# Patient Record
Sex: FEMALE | Race: WHITE | NOT HISPANIC OR LATINO | Employment: FULL TIME | URBAN - METROPOLITAN AREA
[De-identification: names, ages, dates, MRNs, and addresses within clinical notes are randomized per-mention and may not be internally consistent; named-entity substitution may affect disease eponyms.]

---

## 2021-04-28 ENCOUNTER — IMMUNIZATION (OUTPATIENT)
Dept: PRIMARY CARE CLINIC | Facility: CLINIC | Age: 46
End: 2021-04-28
Payer: COMMERCIAL

## 2021-04-28 DIAGNOSIS — Z23 NEED FOR VACCINATION: Primary | ICD-10-CM

## 2021-04-28 PROCEDURE — 0011A PR IMMUNIZ ADMIN, SARS-COV-2 COVID-19 VACC, 100MCG/0.5ML, 1ST DOSE: ICD-10-PCS | Mod: CV19,S$GLB,, | Performed by: INTERNAL MEDICINE

## 2021-04-28 PROCEDURE — 0011A PR IMMUNIZ ADMIN, SARS-COV-2 COVID-19 VACC, 100MCG/0.5ML, 1ST DOSE: CPT | Mod: CV19,S$GLB,, | Performed by: INTERNAL MEDICINE

## 2021-04-28 PROCEDURE — 91301 PR SARS-COV-2 COVID-19 VACCINE, NO PRSV, 100MCG/0.5ML, IM: ICD-10-PCS | Mod: S$GLB,,, | Performed by: INTERNAL MEDICINE

## 2021-04-28 PROCEDURE — 91301 PR SARS-COV-2 COVID-19 VACCINE, NO PRSV, 100MCG/0.5ML, IM: CPT | Mod: S$GLB,,, | Performed by: INTERNAL MEDICINE

## 2021-04-28 RX ADMIN — Medication 0.5 ML: at 10:04

## 2021-09-11 ENCOUNTER — LAB VISIT (OUTPATIENT)
Dept: FAMILY MEDICINE | Facility: CLINIC | Age: 46
End: 2021-09-11
Payer: COMMERCIAL

## 2021-09-11 DIAGNOSIS — Z20.822 ENCOUNTER FOR LABORATORY TESTING FOR COVID-19 VIRUS: ICD-10-CM

## 2021-09-11 PROCEDURE — U0003 INFECTIOUS AGENT DETECTION BY NUCLEIC ACID (DNA OR RNA); SEVERE ACUTE RESPIRATORY SYNDROME CORONAVIRUS 2 (SARS-COV-2) (CORONAVIRUS DISEASE [COVID-19]), AMPLIFIED PROBE TECHNIQUE, MAKING USE OF HIGH THROUGHPUT TECHNOLOGIES AS DESCRIBED BY CMS-2020-01-R: HCPCS | Performed by: EMERGENCY MEDICINE

## 2021-09-11 PROCEDURE — U0005 INFEC AGEN DETEC AMPLI PROBE: HCPCS | Performed by: EMERGENCY MEDICINE

## 2021-09-13 LAB
SARS-COV-2 RNA RESP QL NAA+PROBE: NOT DETECTED
SARS-COV-2- CYCLE NUMBER: NORMAL

## 2022-01-18 ENCOUNTER — LAB VISIT (OUTPATIENT)
Dept: PRIMARY CARE CLINIC | Facility: CLINIC | Age: 47
End: 2022-01-18
Payer: COMMERCIAL

## 2022-01-18 DIAGNOSIS — Z20.822 CONTACT WITH AND (SUSPECTED) EXPOSURE TO COVID-19: ICD-10-CM

## 2022-01-18 LAB
CTP QC/QA: YES
SARS-COV-2 AG RESP QL IA.RAPID: NEGATIVE

## 2022-01-18 PROCEDURE — 87811 SARS-COV-2 COVID19 W/OPTIC: CPT

## 2023-01-09 ENCOUNTER — OFFICE VISIT (OUTPATIENT)
Dept: URGENT CARE | Facility: CLINIC | Age: 48
End: 2023-01-09
Payer: COMMERCIAL

## 2023-01-09 VITALS
WEIGHT: 171 LBS | RESPIRATION RATE: 19 BRPM | DIASTOLIC BLOOD PRESSURE: 82 MMHG | OXYGEN SATURATION: 96 % | SYSTOLIC BLOOD PRESSURE: 125 MMHG | TEMPERATURE: 98 F | BODY MASS INDEX: 28.49 KG/M2 | HEART RATE: 94 BPM | HEIGHT: 65 IN

## 2023-01-09 DIAGNOSIS — R05.9 COUGH, UNSPECIFIED TYPE: Primary | ICD-10-CM

## 2023-01-09 DIAGNOSIS — J18.9 PNEUMONIA DUE TO INFECTIOUS ORGANISM, UNSPECIFIED LATERALITY, UNSPECIFIED PART OF LUNG: ICD-10-CM

## 2023-01-09 LAB
CTP QC/QA: YES
CTP QC/QA: YES
POC MOLECULAR INFLUENZA A AGN: NEGATIVE
POC MOLECULAR INFLUENZA B AGN: NEGATIVE
SARS-COV-2 AG RESP QL IA.RAPID: NEGATIVE

## 2023-01-09 PROCEDURE — 1159F PR MEDICATION LIST DOCUMENTED IN MEDICAL RECORD: ICD-10-PCS | Mod: CPTII,S$GLB,, | Performed by: FAMILY MEDICINE

## 2023-01-09 PROCEDURE — 3008F BODY MASS INDEX DOCD: CPT | Mod: CPTII,S$GLB,, | Performed by: FAMILY MEDICINE

## 2023-01-09 PROCEDURE — 87502 POCT INFLUENZA A/B MOLECULAR: ICD-10-PCS | Mod: QW,S$GLB,, | Performed by: FAMILY MEDICINE

## 2023-01-09 PROCEDURE — 3079F PR MOST RECENT DIASTOLIC BLOOD PRESSURE 80-89 MM HG: ICD-10-PCS | Mod: CPTII,S$GLB,, | Performed by: FAMILY MEDICINE

## 2023-01-09 PROCEDURE — 1160F PR REVIEW ALL MEDS BY PRESCRIBER/CLIN PHARMACIST DOCUMENTED: ICD-10-PCS | Mod: CPTII,S$GLB,, | Performed by: FAMILY MEDICINE

## 2023-01-09 PROCEDURE — 87811 SARS CORONAVIRUS 2 ANTIGEN POCT, MANUAL READ: ICD-10-PCS | Mod: QW,S$GLB,, | Performed by: FAMILY MEDICINE

## 2023-01-09 PROCEDURE — 3008F PR BODY MASS INDEX (BMI) DOCUMENTED: ICD-10-PCS | Mod: CPTII,S$GLB,, | Performed by: FAMILY MEDICINE

## 2023-01-09 PROCEDURE — 3074F PR MOST RECENT SYSTOLIC BLOOD PRESSURE < 130 MM HG: ICD-10-PCS | Mod: CPTII,S$GLB,, | Performed by: FAMILY MEDICINE

## 2023-01-09 PROCEDURE — 87502 INFLUENZA DNA AMP PROBE: CPT | Mod: QW,S$GLB,, | Performed by: FAMILY MEDICINE

## 2023-01-09 PROCEDURE — 87811 SARS-COV-2 COVID19 W/OPTIC: CPT | Mod: QW,S$GLB,, | Performed by: FAMILY MEDICINE

## 2023-01-09 PROCEDURE — 3074F SYST BP LT 130 MM HG: CPT | Mod: CPTII,S$GLB,, | Performed by: FAMILY MEDICINE

## 2023-01-09 PROCEDURE — 1159F MED LIST DOCD IN RCRD: CPT | Mod: CPTII,S$GLB,, | Performed by: FAMILY MEDICINE

## 2023-01-09 PROCEDURE — 99214 PR OFFICE/OUTPT VISIT, EST, LEVL IV, 30-39 MIN: ICD-10-PCS | Mod: S$GLB,,, | Performed by: FAMILY MEDICINE

## 2023-01-09 PROCEDURE — 99214 OFFICE O/P EST MOD 30 MIN: CPT | Mod: S$GLB,,, | Performed by: FAMILY MEDICINE

## 2023-01-09 PROCEDURE — 1160F RVW MEDS BY RX/DR IN RCRD: CPT | Mod: CPTII,S$GLB,, | Performed by: FAMILY MEDICINE

## 2023-01-09 PROCEDURE — 3079F DIAST BP 80-89 MM HG: CPT | Mod: CPTII,S$GLB,, | Performed by: FAMILY MEDICINE

## 2023-01-09 RX ORDER — BENZONATATE 200 MG/1
200 CAPSULE ORAL 3 TIMES DAILY PRN
Qty: 30 CAPSULE | Refills: 0 | Status: SHIPPED | OUTPATIENT
Start: 2023-01-09 | End: 2023-01-10 | Stop reason: SDUPTHER

## 2023-01-09 RX ORDER — DOXYCYCLINE HYCLATE 100 MG
100 TABLET ORAL 2 TIMES DAILY
Qty: 20 TABLET | Refills: 0 | Status: SHIPPED | OUTPATIENT
Start: 2023-01-09 | End: 2023-01-10 | Stop reason: SDUPTHER

## 2023-01-09 NOTE — PROGRESS NOTES
Subjective:       Patient ID: Monica Staton is a 47 y.o. female.    Vitals:  vitals were not taken for this visit.     Chief Complaint: Cough    47 year old patient presents cough. Patient stated symptoms started Thursday    Cough  This is a new problem. The current episode started in the past 7 days (thursday). The problem has been gradually worsening. The problem occurs hourly. Associated symptoms include headaches, nasal congestion and sweats. She has tried OTC cough suppressant for the symptoms. The treatment provided no relief.     Respiratory:  Positive for cough.    Neurological:  Positive for headaches.     Objective:      Physical Exam   Constitutional: She is oriented to person, place, and time. She appears ill. No distress. normal  HENT:   Head: Normocephalic and atraumatic.   Ears:   Right Ear: Tympanic membrane, external ear and ear canal normal.   Left Ear: Tympanic membrane, external ear and ear canal normal.   Nose: Rhinorrhea and congestion present.   Mouth/Throat: Mucous membranes are moist. Oropharynx is clear.   Eyes: Conjunctivae are normal. Pupils are equal, round, and reactive to light. Extraocular movement intact   Neck: Neck supple. No neck rigidity present.   Cardiovascular: Normal rate, regular rhythm, normal heart sounds and normal pulses.   Pulmonary/Chest: Effort normal. No respiratory distress. She has wheezes. She has rhonchi.   Abdominal: Normal appearance and bowel sounds are normal. Soft. flat abdomen   Musculoskeletal: Normal range of motion.         General: No swelling or tenderness. Normal range of motion.   Neurological: no focal deficit. She is alert and oriented to person, place, and time. She displays no weakness. No cranial nerve deficit or sensory deficit.   Skin: Skin is warm and dry. Capillary refill takes less than 2 seconds. jaundice  Psychiatric: Her behavior is normal. Mood, judgment and thought content normal.   Nursing note and vitals reviewed.       Assessment:Plan:     1. Cough, unspecified type  - SARS Coronavirus 2 Antigen, POCT Manual Read  - POCT Influenza A/B MOLECULAR  - XR CHEST PA AND LATERAL; Future  - benzonatate (TESSALON) 200 MG capsule; Take 1 capsule (200 mg total) by mouth 3 (three) times daily as needed.  Dispense: 30 capsule; Refill: 0    2. Pneumonia due to infectious organism, unspecified laterality, unspecified part of lung  - doxycycline (VIBRA-TABS) 100 MG tablet; Take 1 tablet (100 mg total) by mouth 2 (two) times daily.  Dispense: 20 tablet; Refill: 0   All results were discussed prior to pt discharge  Will call pt with cxr results

## 2023-01-10 RX ORDER — DOXYCYCLINE HYCLATE 100 MG
100 TABLET ORAL 2 TIMES DAILY
Qty: 20 TABLET | Refills: 0 | Status: SHIPPED | OUTPATIENT
Start: 2023-01-10 | End: 2024-01-24

## 2023-01-10 RX ORDER — BENZONATATE 200 MG/1
200 CAPSULE ORAL 3 TIMES DAILY PRN
Qty: 30 CAPSULE | Refills: 0 | Status: SHIPPED | OUTPATIENT
Start: 2023-01-10 | End: 2023-01-20

## 2024-01-24 ENCOUNTER — OFFICE VISIT (OUTPATIENT)
Dept: OBSTETRICS AND GYNECOLOGY | Facility: CLINIC | Age: 49
End: 2024-01-24
Payer: COMMERCIAL

## 2024-01-24 VITALS
SYSTOLIC BLOOD PRESSURE: 126 MMHG | DIASTOLIC BLOOD PRESSURE: 86 MMHG | BODY MASS INDEX: 27.77 KG/M2 | HEIGHT: 65 IN | HEART RATE: 97 BPM | WEIGHT: 166.69 LBS

## 2024-01-24 DIAGNOSIS — Z01.419 ENCOUNTER FOR GYNECOLOGICAL EXAMINATION: Primary | ICD-10-CM

## 2024-01-24 DIAGNOSIS — Z12.4 ENCOUNTER FOR PAPANICOLAOU SMEAR FOR CERVICAL CANCER SCREENING: ICD-10-CM

## 2024-01-24 DIAGNOSIS — Z11.51 ENCOUNTER FOR SCREENING FOR HUMAN PAPILLOMAVIRUS (HPV): ICD-10-CM

## 2024-01-24 DIAGNOSIS — Z12.31 BREAST CANCER SCREENING BY MAMMOGRAM: ICD-10-CM

## 2024-01-24 DIAGNOSIS — N39.3 SUI (STRESS URINARY INCONTINENCE, FEMALE): ICD-10-CM

## 2024-01-24 PROCEDURE — 3079F DIAST BP 80-89 MM HG: CPT | Mod: CPTII,S$GLB,, | Performed by: STUDENT IN AN ORGANIZED HEALTH CARE EDUCATION/TRAINING PROGRAM

## 2024-01-24 PROCEDURE — 3008F BODY MASS INDEX DOCD: CPT | Mod: CPTII,S$GLB,, | Performed by: STUDENT IN AN ORGANIZED HEALTH CARE EDUCATION/TRAINING PROGRAM

## 2024-01-24 PROCEDURE — 99999 PR PBB SHADOW E&M-EST. PATIENT-LVL IV: CPT | Mod: PBBFAC,,, | Performed by: STUDENT IN AN ORGANIZED HEALTH CARE EDUCATION/TRAINING PROGRAM

## 2024-01-24 PROCEDURE — 1160F RVW MEDS BY RX/DR IN RCRD: CPT | Mod: CPTII,S$GLB,, | Performed by: STUDENT IN AN ORGANIZED HEALTH CARE EDUCATION/TRAINING PROGRAM

## 2024-01-24 PROCEDURE — 1159F MED LIST DOCD IN RCRD: CPT | Mod: CPTII,S$GLB,, | Performed by: STUDENT IN AN ORGANIZED HEALTH CARE EDUCATION/TRAINING PROGRAM

## 2024-01-24 PROCEDURE — 3074F SYST BP LT 130 MM HG: CPT | Mod: CPTII,S$GLB,, | Performed by: STUDENT IN AN ORGANIZED HEALTH CARE EDUCATION/TRAINING PROGRAM

## 2024-01-24 PROCEDURE — 88175 CYTOPATH C/V AUTO FLUID REDO: CPT | Performed by: STUDENT IN AN ORGANIZED HEALTH CARE EDUCATION/TRAINING PROGRAM

## 2024-01-24 PROCEDURE — 87624 HPV HI-RISK TYP POOLED RSLT: CPT | Performed by: STUDENT IN AN ORGANIZED HEALTH CARE EDUCATION/TRAINING PROGRAM

## 2024-01-24 PROCEDURE — 99386 PREV VISIT NEW AGE 40-64: CPT | Mod: S$GLB,,, | Performed by: STUDENT IN AN ORGANIZED HEALTH CARE EDUCATION/TRAINING PROGRAM

## 2024-01-24 RX ORDER — DROSPIRENONE 4 MG/1
4 TABLET, FILM COATED ORAL
COMMUNITY

## 2024-01-24 RX ORDER — ALPRAZOLAM 0.25 MG/1
TABLET ORAL
COMMUNITY
Start: 2018-10-01

## 2024-01-24 RX ORDER — LISDEXAMFETAMINE DIMESYLATE CAPSULES 10 MG/1
CAPSULE ORAL
COMMUNITY
Start: 2018-01-01

## 2024-01-24 RX ORDER — LOSARTAN POTASSIUM 100 MG/1
100 TABLET ORAL
COMMUNITY
Start: 2023-11-09

## 2024-01-27 LAB
FINAL PATHOLOGIC DIAGNOSIS: NORMAL
Lab: NORMAL

## 2024-01-29 LAB
HPV HR 12 DNA SPEC QL NAA+PROBE: NEGATIVE
HPV16 AG SPEC QL: NEGATIVE
HPV18 DNA SPEC QL NAA+PROBE: NEGATIVE

## 2024-03-05 NOTE — PROGRESS NOTES
"Chief Complaint: Well Woman Exam     HPI:      Monica Staton is a 48 y.o.  who presents today for well woman exam. No GYN issues, problems, or complaints. Does report leakage of urine with coughing/jumping - wears leak proof panties. Specifically, patient denies abnormal vaginal bleeding, discharge, pelvic pain, urinary problems, or changes in appetite. Ms. Staton is currently sexually active with a single male partner. She is currently using oral progesterone-only contraceptive (Slynd) for contraception.     Previous Pap: none documented  Previous Mammogram: BiRads 1 (2023)  Most Recent Colonoscopy: Negative cologuard (2023)    Past Medical History:   Diagnosis Date    Hypercholesteremia     Hypertension      History reviewed. No pertinent surgical history.  Social History     Socioeconomic History    Marital status:    Tobacco Use    Smoking status: Never    Smokeless tobacco: Never   Substance and Sexual Activity    Alcohol use: Yes    Drug use: Never    Sexual activity: Yes     Partners: Male     Family History   Problem Relation Age of Onset    No Known Problems Father     Lung cancer Mother      Review of patient's allergies indicates:   Allergen Reactions    Nsaids (non-steroidal anti-inflammatory drug) Hives and Swelling     OB History          4    Para   2    Term   2            AB   2    Living   2         SAB   2    IAB        Ectopic        Multiple        Live Births   2                 Physical Exam:      PHYSICAL EXAM:  /86   Pulse 97   Ht 5' 5" (1.651 m)   Wt 75.6 kg (166 lb 10.7 oz)   BMI 27.73 kg/m²   Body mass index is 27.73 kg/m².     APPEARANCE: Well nourished, well developed, in no acute distress.  PSYCH: Appropriate mood and affect.  SKIN: No acne or hirsutism  NECK: Neck symmetric without masses  NODES: No inguinal, axillary, or supraclavicular lymph node enlargement  ABDOMEN: Soft.  No tenderness or masses.    CARDIOVASCULAR: No edema of " peripheral extremities  BREASTS: Symmetrical, no visible skin lesions. No palpable masses. No nipple discharge bilaterally.  PELVIC: Normal external genitalia without lesions.  Normal hair distribution.  Adequate perineal body, normal urethral meatus.  Vagina moist and well rugated. Without lesions. Without discharge.  Cervix pink, without lesions, discharge or tenderness.  No significant cystocele or rectocele.  Bimanual exam shows uterus to be normal size, regular, mobile and nontender.  Adnexa without masses or tenderness.      Assessment/Plan:     Encounter for gynecological examination    Encounter for Papanicolaou smear for cervical cancer screening  -     Liquid-Based Pap Smear, Screening    Encounter for screening for human papillomavirus (HPV)  -     HPV High Risk Genotypes, PCR    Breast cancer screening by mammogram  -     Mammo Digital Screening Bilat w/ Michael; Future; Expected date: 01/24/2024    ELBERT (stress urinary incontinence, female)  -     Ambulatory referral/consult to Physical/Occupational Therapy      - pelvic exam normal  - pap/hpv collected  - pelvic floor PT for ELBERT  - MMG due and ordered  - rtc 1 yr for annual or sooner if needed    Counseling:     Patient was counseled today on current ASCCP pap guidelines, the recommendation for yearly physical exams, safe driving habits, breast self awareness and annual mammograms. She is to see her PCP for other health maintenance.     Use of the [employer] Patient Portal discussed and encouraged during today's visit.

## 2024-04-10 PROBLEM — M62.89 PELVIC FLOOR DYSFUNCTION: Status: ACTIVE | Noted: 2024-04-10

## 2024-05-24 ENCOUNTER — CLINICAL SUPPORT (OUTPATIENT)
Dept: REHABILITATION | Facility: HOSPITAL | Age: 49
End: 2024-05-24
Payer: COMMERCIAL

## 2024-05-24 DIAGNOSIS — M62.89 PELVIC FLOOR DYSFUNCTION: Primary | ICD-10-CM

## 2024-05-24 PROCEDURE — 97530 THERAPEUTIC ACTIVITIES: CPT

## 2024-05-24 PROCEDURE — 97162 PT EVAL MOD COMPLEX 30 MIN: CPT

## 2024-05-24 PROCEDURE — 97110 THERAPEUTIC EXERCISES: CPT

## 2024-05-24 NOTE — PROGRESS NOTES
[facility]  Pelvic Health Physical Therapy Initial Evaluation    Date: 2024   Name: Monica Staton  Clinic Number: [id number]  Therapy Diagnosis:   Encounter Diagnosis   Name Primary?    Pelvic floor dysfunction Yes     Physician: Osiris Gonzalez MD    Physician Orders: PT Eval and Treat    Medical Diagnosis from Referral: ELBERT (stress urinary incontinence, female) [N39.3]   Evaluation Date: 2024  Authorization Period Expiration: 3/4/2025  Plan of Care Expiration: 2024  Visit # / Visits authorized:     Time In: 3:00  Time Out: 4:00  Total Appointment Time (timed & untimed codes): 55 minutes    Precautions: universal    Subjective     Date of onset: 4-5 years ago    History of current condition - Monica reports: that she leaks when she walks- had an episode of leakage with static standing.  Has had a recent weight loss, and she reports that she has stopped drinking.      OB/GYN History: ;  x 2; fast deliveries; 2 episiotomies; no periods due to hormonal birth control.    Sexually active? Yes  Pain with vaginal exams, intercourse or tampon use? No    Bladder/Bowel History: dribbling after urination, trouble emptying bladder completely, and urinary incontinence  Frequency of urination:   Daytime: every 2 hours           Nighttime: 2  Difficulty initiating urine stream: No  Urine stream: strong  Complete emptying: Yes  Bladder leakage: Yes  Frequency of incidents: daily  Amount leaked (urine): few drops and full emptying  Urinary Urgency: No, Able to delay the urge for at least 30 minute(s).  Frequency of bowel movements: 2 times a day  Difficulty initiating BM: No  Quality/Shape of BM: [place] Stool Chart 4, 2 if eating cheese  Does Patient Feel Empty after BM? Yes  Fiber Supplements or Laxative Use? Yes; prune juice, senna, and fiber supplement  Colon leakage: No  Form of protection: pad  Number of pads required in 24 hours: 2-3    Pain:  none     Medical History: Monica   has a past medical history of Hypercholesteremia and Hypertension.     Surgical History: Monica Staton  has no past surgical history on file.    Medications: Monica has a current medication list which includes the following prescription(s): alprazolam, slynd, lisdexamfetamine, losartan, and rosuvastatin.    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  Prior Level of Function: could walk without leaking  Current Level of Function: leaks urine with most movements    Types of fluid intake: water, coffee, tea, and energy drink before work  Diet: on Ozempic   Habitus: well developed, well nourished  Abuse/Neglect: No     Pts goals: to be able to walk for fitness without urine leakage.      OBJECTIVE     See EMR under MEDIA for written consent provided 5/24/2024  Chaperone: declined    ORTHO SCREEN  Posture in sitting: WNL  Posture in standing: WNL  Pelvic alignment: no sign of deviations noted in supine     ABDOMINAL WALL ASSESSMENT  Abdominal strength: Rectus abdominus: 2/5     Transverse abdominus: poorly isolated from rectus  Scarring: none  Diastasis: absent       BREATHING MECHANICS ASSESSMENT   Thorax Assessment During Quiet Respiration: WNL excursion of abdominal wall  Thorax Assessment During Deep Respiration: Decreased excursion of lateral ribs    VAGINAL PELVIC FLOOR EXAM    EXTERNAL ASSESSMENT  Introitus: WNL  Skin condition: redness noted at the 6 o'clock position  Scarring: episiotomy scar noted   Sensation: WNL   Pain: none    Voluntary contraction: visible  lift  Voluntary relaxation: visible drop  Involuntary contraction: visible lift  Bearing down: bulge  Perineal descent: absent      INTERNAL ASSESSMENT  Pain: none   Sensation: able to localized pressure appropriately   Vaginal vault: asymmetrical   Muscle Bulk: WFL   Muscle Power: 2+/5  Muscle Endurance: 5 sec (lost lift with breath)       Quality of contraction: decreased hold   Specificity: WNL   Coordination: tends to hold breath during PFM contraction   Prolapse check: cervix low in the vaginal canal      Limitation/Restriction for FOTO PFDI Urinary Survey    Therapist reviewed FOTO scores for Monica Staton on 5/24/2024.   FOTO documents entered into [employer] - see Media section.    Limitation Score: 54%       TREATMENT     Treatment Time In: 3:35  Treatment Time Out: 4:00  Total Treatment time (time-based codes) separate from Evaluation: 25 minutes    Therapeutic Exercise to develop strength and endurance for 10 minutes including:  Kegels Endurance Holds    Therapeutic Activity Patient participated in dynamic functional therapeutic activities to improve functional performance for 15 minutes. Including: Education as described below.     Patient Education provided:   general anatomy/physiology of urinary/ bowel  system, benefits of treatment, risks of treatment, and alternative methods of treatment were discussed with the pt. Additionally, double voiding techniques were reviewed.     Home Exercises provided:  Written Home Exercises provided: yes.  Exercises were reviewed and Monica was able to demonstrate them prior to the end of the session.    Monica demonstrated good  understanding of the education provided.     See EMR under Patient Instructions for exercises provided 5/24/2024.    Assessment     Monica is a 48 y.o. female referred to outpatient Physical Therapy with a medical diagnosis of ELBERT (stress urinary incontinence, female) [N39.3] . Pt presents with poor knowledge of body mechanics and posture,  poor trunk stability, decreased pelvic muscle strength, decreased endurance of the pelvic muscles, increased nocturia, and poor coordination of pelvic floor muscles during ADL's leading to urinary or fecal leakage.       Pt prognosis is Good.   Pt will benefit from skilled outpatient Physical Therapy to address the deficits stated above and in the chart below, provide pt/family education, and to maximize pt's level of independence.     Plan of care discussed with patient: Yes  Pt's spiritual, cultural and educational needs considered and patient is agreeable to the plan of care and goals as stated below:     Anticipated Barriers for therapy: none    Medical Necessity is demonstrated by the following:    History  Co-morbidities and personal factors that may impact the plan of care Co-morbidities   HTN    Personal Factors  no deficits     moderate   Examination  Body structures and functions, activity limitations and participation restrictions that may impact the plan of care Body Regions/Systems/Functions:   poor knowledge of body mechanics and posture, poor trunk stability, decreased pelvic muscle strength, decreased endurance of the pelvic muscles, increased nocturia, and poor coordination of pelvic floor muscles during ADL's leading to urinary or fecal leakage    Activity Limitations:  incontinence with ADLs    Participation Restrictions:  all ADLs/iADLs uninterrupted by urinary incontinence/urgency/frequency, Sleep restrictions, and exercise restrictions due to incontinence     Activity limitations:   Learning and applying knowledge  None    General Tasks and Commands  None    Communication  None    Mobility  None    Self care  None    Domestic Life  None    Interactions/Relationships  None    Life Areas  None    Community and Social Life  None       high   Clinical Presentation unstable clinical presentation with unpredictable characteristics moderate   Decision Making/ Complexity Score: moderate        Goals:  Short Term Goals: 2 weeks  Pt to report increased awareness of PFM lift/drop during exercises and functional activities.  Pt to be I with double voiding techniques.  Pt to be I with diaphragmatic breathing.      Long Term Goals: 12 weeks   Pt will report improved ability to perform ADLs (ie. dressing, bathing, functional transfers) with little (drops) to no urinary leakage 7/7 days per week.  Pt will be able to participate in exercise/recess/active play with less leakage of urine.   Pt will report improved ability to perform iADLs (ie. driving, home chores, grocery shopping) with little (drops) to no urinary leakage 7/7 days per week.   Pt will report improved ability to perform advanced iADLs (ie. gardening, yard work, heavy lifting, moving furniture) with little (drops) to no urinary leakage 7/7 days per week.   Pt will report improved ability to cough/sneeze/laugh/yell with little (drops) to no urinary leakage 7/7 days per week.   Pt will report a decrease in pad use to 1 pads per day.  Pt/family will be independent with HEP for continued self-management of symptoms.     Plan     Plan of care Certification: 5/24/2024 to 8/24/2024.    Outpatient Physical Therapy 1 times per 2 week(s)  for 12 weeks to include the following interventions: therapeutic exercises, therapeutic activity, neuromuscular re-education, manual therapy, modalities PRN, patient/family education, and self care/home management    Nell Garcia, PT, BCB-PMD

## 2024-05-24 NOTE — PATIENT INSTRUCTIONS
Home Program: 2024    Kegels in lying or sittin. Sit or lie down comfortably with legs and buttocks relaxed.  2. Squeeze and LIFT the muscles that stop the flow of urine and passage of gas.  Keep legs and buttock muscles quiet.  3. Hold lift for 10 seconds without holding breath.  4. Release and DROP for 10 seconds.  5. Repeat 10 times, 2 sets, 1-2 times per day.      No Kegels while urinating!          Double Voiding: more than one urine stream per sitting.    When your urine stream stops, perform the followin. Body Scan to be sure that your legs, buttocks, and belly are relaxed.  Make sure that your legs are spread apart, and your pelvis is tilted forward.    2. Diaphragmatic breathing (belly breathing) to re-drop the pelvic floor.  3. Bladder tapping (as shown in clinic)  4. Stand up and sit back down.      If no more urine comes in 2-3 minutes, you're done!     Urogynecologists:  Dr. Gustavo Jordan

## 2024-05-31 ENCOUNTER — OFFICE VISIT (OUTPATIENT)
Dept: URGENT CARE | Facility: CLINIC | Age: 49
End: 2024-05-31
Payer: COMMERCIAL

## 2024-05-31 VITALS
RESPIRATION RATE: 18 BRPM | BODY MASS INDEX: 27.66 KG/M2 | HEIGHT: 65 IN | SYSTOLIC BLOOD PRESSURE: 108 MMHG | DIASTOLIC BLOOD PRESSURE: 74 MMHG | TEMPERATURE: 98 F | OXYGEN SATURATION: 96 % | WEIGHT: 166 LBS | HEART RATE: 91 BPM

## 2024-05-31 DIAGNOSIS — R05.9 COUGH, UNSPECIFIED TYPE: Primary | ICD-10-CM

## 2024-05-31 DIAGNOSIS — J35.1 ENLARGED TONSILS: ICD-10-CM

## 2024-05-31 DIAGNOSIS — J02.9 VIRAL PHARYNGITIS: ICD-10-CM

## 2024-05-31 LAB
CTP QC/QA: YES
CTP QC/QA: YES
MOLECULAR STREP A: NEGATIVE
SARS-COV-2 AG RESP QL IA.RAPID: NEGATIVE

## 2024-05-31 PROCEDURE — 87651 STREP A DNA AMP PROBE: CPT | Mod: QW,S$GLB,,

## 2024-05-31 PROCEDURE — 99204 OFFICE O/P NEW MOD 45 MIN: CPT | Mod: S$GLB,,,

## 2024-05-31 PROCEDURE — 87811 SARS-COV-2 COVID19 W/OPTIC: CPT | Mod: QW,S$GLB,,

## 2024-05-31 RX ORDER — SEMAGLUTIDE 0.68 MG/ML
0.5 INJECTION, SOLUTION SUBCUTANEOUS
COMMUNITY

## 2024-05-31 RX ORDER — PREDNISONE 20 MG/1
40 TABLET ORAL DAILY
Qty: 6 TABLET | Refills: 0 | Status: SHIPPED | OUTPATIENT
Start: 2024-05-31 | End: 2024-06-03

## 2024-05-31 NOTE — PATIENT INSTRUCTIONS
Please drink plenty of fluids and get plenty of rest.      Gargle salt water and enjoy cool or warm liquids as needed for comfort.    Get a new  toothbrush after tomorrow.     If not allergic, please take over the counter Tylenol (Acetaminophen) and/or Motrin (Ibuprofen) as directed for control of pain and/or fever.    Please follow up with your primary care doctor or specialist as needed.

## 2024-05-31 NOTE — PROGRESS NOTES
"Subjective:      Patient ID: Monica Staton is a 48 y.o. female.    Vitals:  height is 5' 5" (1.651 m) and weight is 75.3 kg (166 lb). Her oral temperature is 98.3 °F (36.8 °C). Her blood pressure is 108/74 and her pulse is 91. Her respiration is 18 and oxygen saturation is 96%.     Chief Complaint: Sore Throat    This is a 48 y.o. female who presents today with a chief complaint of cough, body aches, sore throat and fatigue. Patient states she started feeling bad yesterday at work with a sore throat and this morning she woke up with body aches.  Patient states she is allergic to NSAIDs, we will take Tylenol for pain as needed.  She states she has very mild congestion and cough, but her throat is very painful.  Denies any fever, chest pain, shortness for breath, abdominal pain, or other associated complaints.    Sore Throat   This is a new problem. The current episode started yesterday. The problem has been gradually worsening. There has been no fever. The patient is experiencing no pain. Associated symptoms include congestion, coughing, headaches and a hoarse voice. Pertinent negatives include no abdominal pain, diarrhea, drooling, ear discharge, ear pain, plugged ear sensation, neck pain, shortness of breath, stridor, swollen glands or vomiting. She has tried nothing for the symptoms. The treatment provided no relief.       Constitution: Negative for fever and generalized weakness.   HENT:  Positive for congestion and sore throat. Negative for ear pain, ear discharge, drooling and sinus pain.    Neck: Negative for neck pain.   Cardiovascular:  Negative for chest pain.   Respiratory:  Positive for cough. Negative for shortness of breath and stridor.    Gastrointestinal:  Negative for abdominal pain, vomiting and diarrhea.   Genitourinary:  Negative for dysuria.   Neurological:  Positive for headaches.      Objective:     Physical Exam   Constitutional: She is oriented to person, place, and time. She appears " well-developed. She is cooperative.  Non-toxic appearance. She does not appear ill. No distress.      Comments:Tired appearing, but awake and cooperative.  Answering questions appropriately     HENT:   Head: Normocephalic and atraumatic.   Ears:   Right Ear: Hearing, tympanic membrane, external ear and ear canal normal.   Left Ear: Hearing, tympanic membrane, external ear and ear canal normal.   Nose: Nose normal. No mucosal edema, rhinorrhea or nasal deformity. No epistaxis. Right sinus exhibits no maxillary sinus tenderness and no frontal sinus tenderness. Left sinus exhibits no maxillary sinus tenderness and no frontal sinus tenderness.   Mouth/Throat: Uvula is midline and mucous membranes are normal. No trismus in the jaw. Normal dentition. No uvula swelling. Posterior oropharyngeal erythema present. No oropharyngeal exudate or posterior oropharyngeal edema. Tonsils are 2+ on the right. Tonsils are 2+ on the left.   Eyes: Conjunctivae and lids are normal. No scleral icterus.   Neck: Trachea normal and phonation normal. Neck supple. No edema present. No erythema present. No neck rigidity present.   Cardiovascular: Normal rate, regular rhythm, normal heart sounds and normal pulses.   Pulmonary/Chest: Effort normal and breath sounds normal. No respiratory distress. She has no decreased breath sounds. She has no rhonchi.   Breath sounds clear bilaterally         Comments: Breath sounds clear bilaterally    Abdominal: Normal appearance and bowel sounds are normal. Soft.   Musculoskeletal: Normal range of motion.         General: No deformity. Normal range of motion.   Neurological: She is alert and oriented to person, place, and time. She exhibits normal muscle tone. Coordination normal.   Skin: Skin is warm, dry, intact, not diaphoretic and not pale.   Psychiatric: Her speech is normal and behavior is normal. Judgment and thought content normal.   Nursing note and vitals reviewed.      Assessment:     1. Cough,  unspecified type    2. Viral pharyngitis    3. Enlarged tonsils        Plan:   Physical exam as described above, patient has significant erythema to her pharynx, as well as enlarged tonsils.  Suspect viral illness causing pharyngitis and enlarged tonsils, discussed with patient that I do not think that an antibiotic is indicated at this time, however we will give short course of oral steroids for inflammation of tonsils.  Patient acknowledges plan of care, discussed urgent care return precautions as well as follow up with PCP if symptoms do not improve.  Patient acknowledges understanding and will follow up as needed.    Results for orders placed or performed in visit on 05/31/24   SARS Coronavirus 2 Antigen, POCT Manual Read   Result Value Ref Range    SARS Coronavirus 2 Antigen Negative Negative     Acceptable Yes    POCT Strep A, Molecular   Result Value Ref Range    Molecular Strep A, POC Negative Negative     Acceptable Yes          Cough, unspecified type  -     SARS Coronavirus 2 Antigen, POCT Manual Read  -     POCT Strep A, Molecular    Viral pharyngitis  -     predniSONE (DELTASONE) 20 MG tablet; Take 2 tablets (40 mg total) by mouth once daily. for 3 days  Dispense: 6 tablet; Refill: 0    Enlarged tonsils  -     predniSONE (DELTASONE) 20 MG tablet; Take 2 tablets (40 mg total) by mouth once daily. for 3 days  Dispense: 6 tablet; Refill: 0      Patient Instructions   Please drink plenty of fluids and get plenty of rest.      Gargle salt water and enjoy cool or warm liquids as needed for comfort.    Get a new  toothbrush after tomorrow.     If not allergic, please take over the counter Tylenol (Acetaminophen) and/or Motrin (Ibuprofen) as directed for control of pain and/or fever.    Please follow up with your primary care doctor or specialist as needed.

## 2024-06-02 RX ORDER — AMOXICILLIN AND CLAVULANATE POTASSIUM 875; 125 MG/1; MG/1
1 TABLET, FILM COATED ORAL EVERY 12 HOURS
Qty: 10 TABLET | Refills: 0 | Status: SHIPPED | OUTPATIENT
Start: 2024-06-02 | End: 2024-06-07

## 2024-06-02 NOTE — PROGRESS NOTES
Patient called clinic to say that her symptoms have not resolved with steroid use.  Per our previous conversation when I saw patient in clinic, will prescribe a short course of Augmentin for bacterial sinusitis at this time.  No known medication allergies.

## 2024-06-13 ENCOUNTER — TELEPHONE (OUTPATIENT)
Dept: UROGYNECOLOGY | Facility: CLINIC | Age: 49
End: 2024-06-13
Payer: COMMERCIAL

## 2024-06-13 ENCOUNTER — OFFICE VISIT (OUTPATIENT)
Dept: UROGYNECOLOGY | Facility: CLINIC | Age: 49
End: 2024-06-13
Payer: COMMERCIAL

## 2024-06-13 VITALS
SYSTOLIC BLOOD PRESSURE: 105 MMHG | DIASTOLIC BLOOD PRESSURE: 70 MMHG | WEIGHT: 156.31 LBS | HEART RATE: 96 BPM | BODY MASS INDEX: 26.01 KG/M2

## 2024-06-13 DIAGNOSIS — N39.3 STRESS INCONTINENCE: Primary | ICD-10-CM

## 2024-06-13 DIAGNOSIS — R39.15 URINARY URGENCY: ICD-10-CM

## 2024-06-13 DIAGNOSIS — N39.41 URGE INCONTINENCE OF URINE: ICD-10-CM

## 2024-06-13 LAB
BILIRUB SERPL-MCNC: ABNORMAL MG/DL
BLOOD URINE, POC: 250
CLARITY, POC UA: CLEAR
COLOR, POC UA: YELLOW
GLUCOSE UR QL STRIP: ABNORMAL
KETONES UR QL STRIP: ABNORMAL
LEUKOCYTE ESTERASE URINE, POC: ABNORMAL
NITRITE, POC UA: ABNORMAL
PH, POC UA: 6
POC RESIDUAL URINE VOLUME: 0 ML (ref 0–100)
PROTEIN, POC: ABNORMAL
SPECIFIC GRAVITY, POC UA: 1.02
UROBILINOGEN, POC UA: ABNORMAL

## 2024-06-13 PROCEDURE — 81002 URINALYSIS NONAUTO W/O SCOPE: CPT | Mod: S$GLB,,, | Performed by: OBSTETRICS & GYNECOLOGY

## 2024-06-13 PROCEDURE — 1159F MED LIST DOCD IN RCRD: CPT | Mod: CPTII,S$GLB,, | Performed by: OBSTETRICS & GYNECOLOGY

## 2024-06-13 PROCEDURE — 4010F ACE/ARB THERAPY RXD/TAKEN: CPT | Mod: CPTII,S$GLB,, | Performed by: OBSTETRICS & GYNECOLOGY

## 2024-06-13 PROCEDURE — 99999 PR PBB SHADOW E&M-EST. PATIENT-LVL III: CPT | Mod: PBBFAC,,, | Performed by: OBSTETRICS & GYNECOLOGY

## 2024-06-13 PROCEDURE — 99215 OFFICE O/P EST HI 40 MIN: CPT | Mod: S$GLB,,, | Performed by: OBSTETRICS & GYNECOLOGY

## 2024-06-13 PROCEDURE — 51798 US URINE CAPACITY MEASURE: CPT | Mod: S$GLB,,, | Performed by: OBSTETRICS & GYNECOLOGY

## 2024-06-13 PROCEDURE — 3008F BODY MASS INDEX DOCD: CPT | Mod: CPTII,S$GLB,, | Performed by: OBSTETRICS & GYNECOLOGY

## 2024-06-13 PROCEDURE — 1160F RVW MEDS BY RX/DR IN RCRD: CPT | Mod: CPTII,S$GLB,, | Performed by: OBSTETRICS & GYNECOLOGY

## 2024-06-13 PROCEDURE — 3078F DIAST BP <80 MM HG: CPT | Mod: CPTII,S$GLB,, | Performed by: OBSTETRICS & GYNECOLOGY

## 2024-06-13 PROCEDURE — 3074F SYST BP LT 130 MM HG: CPT | Mod: CPTII,S$GLB,, | Performed by: OBSTETRICS & GYNECOLOGY

## 2024-06-13 NOTE — PROGRESS NOTES
Chief Complaint   Patient presents with    Urinary Incontinence        HPI: Patient is a 48 y.o. female  who presents today with c/o urinary incontinence. She states that she made an appointment with a pelvic floor PT. During the exam she mentioned that her pelvic floor muscles are weak but wanted to have her be evaluated to ensure that there was no prolapse. Saw Nell Garcia and has had one PT appointment.     Patient reports that her urinary incontinence started about 5 years ago. Reports that she started to notice that she couldn't walk for 2-3 blocks without soaking though a pad. This would occur even when she would empty her bladder before the walk. She also reports loss of urine with a cough, sneeze or laugh. She may have some urinary urgency but not routinely. Only would be an issue if she drank a lot of water of tea and then had to go on a drive or commute home. She denies urinary frequency. She wakes two times per night to void. She denies enuresis. She has leaked some urine when she has the urge to void and is walking or standing up. Reports wearing Impressa Poise and finding it helpful.     She has a continuous and strong urine stream. Feels that she empties her bladder well. She drinks 1 cup of coffee in the morning. Drinks 64 ounces of water per day. If she drinks tea may have 20 ounces but this is not daily. Tries to stop drinking water an hour before she leaves her home.     She denies vaginal heaviness, pressure or bulge. She denies dyspareunia.     Normal bowel movements and denies accidental bowel leakage.       Review of Systems   Constitutional:  Negative for activity change, appetite change, chills, fatigue, fever and unexpected weight change.   HENT:  Negative for nasal congestion, dental problem, hearing loss, mouth dryness and sore throat.    Eyes:  Negative for pain and eye dryness.   Respiratory:  Negative for cough, shortness of breath and wheezing.    Cardiovascular:  Negative for  chest pain, palpitations and leg swelling.   Gastrointestinal:  Negative for abdominal distention, abdominal pain, blood in stool, constipation and rectal pain.   Endocrine: Negative for cold intolerance and heat intolerance.   Genitourinary:  Negative for dyspareunia, hot flashes and vaginal dryness.   Musculoskeletal:  Negative for arthralgias, back pain, gait problem, joint swelling, myalgias, neck pain and neck stiffness.   Integumentary:  Negative for rash.   Neurological:  Negative for dizziness, tremors, seizures, speech difficulty, weakness, light-headedness, numbness and headaches.   Psychiatric/Behavioral:  Negative for confusion, dysphoric mood and sleep disturbance. The patient is not nervous/anxious.         Past Medical History:   Diagnosis Date    Hypercholesteremia     Hypertension        Past Surgical History:   Procedure Laterality Date    NO PAST SURGERIES           Current Outpatient Medications:     ALPRAZolam (XANAX) 0.25 MG tablet, , Disp: , Rfl:     drospirenone, contraceptive, (SLYND) 4 mg (28) Tab, Take 4 mg by mouth., Disp: , Rfl:     lisdexamfetamine (VYVANSE) 10 mg Cap, , Disp: , Rfl:     losartan (COZAAR) 100 MG tablet, Take 100 mg by mouth., Disp: , Rfl:     rosuvastatin 40 mg CpSP, , Disp: , Rfl:     semaglutide (OZEMPIC) 0.25 mg or 0.5 mg (2 mg/3 mL) pen injector, Inject 0.5 mg into the skin every 7 days., Disp: , Rfl:     Review of patient's allergies indicates:   Allergen Reactions    Nsaids (non-steroidal anti-inflammatory drug) Hives and Swelling       Family History   Problem Relation Name Age of Onset    Lung cancer Mother      Hypertension Father      Aortic aneurysm Father         Social History     Socioeconomic History    Marital status:    Tobacco Use    Smoking status: Never    Smokeless tobacco: Never   Substance and Sexual Activity    Alcohol use: Not Currently    Drug use: Never    Sexual activity: Yes     Partners: Male   Social History Narrative    Lives with  spouse and two children. Feels safe in her home.        OB History          4    Para   2    Term   2            AB   2    Living   2         SAB   2    IAB        Ectopic        Multiple        Live Births   2                 Gyn History    Pap smear: 24 negative  LMP: Patient's last menstrual period was 2024.   Postmenopausal bleeding: n/a      INITIAL PHYSICAL EXAMINATION    Vitals:    24 1458   BP: 105/70   Pulse: 96      General: Healthy in appearance, Well nourished, Affect Normal, NAD.  Neck: Supple, No masses, Trachea midline, Thyroid normal size,  non-tender, no masses or nodules.  Nodes: No clavicular/cervical adenopathy.  Skin: Normal temperature, No atypical lesions or rash.  Heart: Normal sounds, no murmurs  Lungs: Normal respiratory effort.  Gastrointestinal: Non tender, Non distended, No masses, guarding or  rebound, No hepatosplenomegally, No hernia.  Ext: No clubbing, cyanosis, edema or varicosities.  DTR's: 2+ bilaterally  Strength 5/5 bilateral upper and lower extremities    Genitourinary- (Verbal consent obtained; Female chaperone present during the pelvic exam)    Vulva: normal without lesions, masses, atrophy or pain  Urethra: meatus central and normal in appearance, no prolapse/caruncle, no masses or discharge. Empty supine stress test was negative.  Bladder: non-tender, no masses  Vagina: No discharge or lesions, no atrophy, no masses appreciated.  [See POP-Q]  Cervix: no lesions, no discharge  Uterus:  non-tender, anteverted, approximately 5 weeks size  Adnexa: no masses, non tender.  Rectal: deferred   Neuro: S2-4- pin prick and light touch intact, Anal wink present  Levator strength: 3/5  Levator tenderness: left 0/10 and right 0/10    POP-Q Exam- pelvic organ prolapse quantitative    Aa -2.5  Anterior Wall Ba -2.5  Anterior wall C -5  Cervix or cuff   Gh 4  Genital hiatus pb 3  perineal body tvl 8.5  Total vaginal length   Ap -2.5  Posterior wall Bp  -2.5  Posterior wall D -8.5  Posterior fornix     with Uterus    POP-Q Stage:1      Office Visit on 06/13/2024   Component Date Value Ref Range Status    Glucose, UA 06/13/2024 nor   Final    Bilirubin, POC 06/13/2024 neg   Final    Ketones, UA 06/13/2024 neg   Final    Spec Grav UA 06/13/2024 1.020   Final    Blood, UA 06/13/2024 250   Final    pH, UA 06/13/2024 6   Final    Protein, POC 06/13/2024 trace   Final    Urobilinogen, UA 06/13/2024 neg   Final    Nitrite, UA 06/13/2024 neg   Final    WBC, UA 06/13/2024 neg   Final    Color, UA 06/13/2024 Yellow   Final    Clarity, UA 06/13/2024 Clear   Final    POC Residual Urine Volume 06/13/2024 0  0 - 100 mL Final        ASSESSMENT & PLAN:    Stress incontinence  -     POCT URINE DIPSTICK WITHOUT MICROSCOPE  -     POCT Bladder Scan    Urge incontinence of urine    Urinary urgency         Exam findings and treatment options were discussed in detail with the patient. She was reassured that there is minimal prolapse on examination. Her symptoms are consistent with mixed urinary incontinence. We spent time in discussion today of the differences between UUI and ELBERT. We reviewed treatment options of each type of incontinence as well, discussing in detail that for UUI she can try conservative measures such as bladder retraining, PT or medications and for ELBERT options such as PT, Impressa Poise/ Revive, pessary use or surgery. Additional information was provided.    She will do additional pelvic floor PT and try Revive. May consider a pessary and would schedule with our NP or PA for a fitting if she decides to proceed. Will return in about 3 months for re-evaluation of symptoms.     All questions were answered today. The patient was encouraged to contact the office as needed with any additional questions or concerns.     Total time spent on visit was 40 minutes.  This includes face to face time and non-face to face time preparing to see the patient (eg, review of tests),  Obtaining and/or reviewing separately obtained history, Documenting clinical information in the electronic or other health record, Independently interpreting resultsand communicating results to the patient/family/caregiver, or Care coordination.    Etta Gutierrez MD

## 2024-07-05 ENCOUNTER — CLINICAL SUPPORT (OUTPATIENT)
Dept: REHABILITATION | Facility: HOSPITAL | Age: 49
End: 2024-07-05
Payer: COMMERCIAL

## 2024-07-05 DIAGNOSIS — M62.89 PELVIC FLOOR DYSFUNCTION: Primary | ICD-10-CM

## 2024-07-05 PROCEDURE — 97112 NEUROMUSCULAR REEDUCATION: CPT

## 2024-07-05 NOTE — PATIENT INSTRUCTIONS
Home Program: 2024    Kegels in standin. Stand comfortably with legs and buttocks relaxed.  Make darrell ethat your ribcage and pelvis are aligned.  2. Squeeze and LIFT the muscles that stop the flow of urine and passage of gas.  Keep legs and buttock muscles quiet.  3. Hold lift for 10 seconds without holding breath.  4. Release and DROP for 10 seconds.  5. Repeat 10 times, 2 sets, 1-2 times per day.      No Kegels while urinating!     Abdominal sets:      In same position, draw in your lower belly as if zipping pants (self palpate just inside of your pelvic bone in the front.    2. Hold this for 5 sec without holding breath.  3. Release for 10 sec.  4. Repeat 10 times, 1 set, 1-2 times per day.           Double Voiding: keep doing this    Impressa

## 2024-07-05 NOTE — PROGRESS NOTES
Pelvic Health Physical Therapy   Treatment Note     Name: Monica Staton  Clinic Number: [id number]    Therapy Diagnosis:   Encounter Diagnosis   Name Primary?    Pelvic floor dysfunction Yes     Physician: Osiris Gonzalez MD    Visit Date: 7/5/2024    Physician Orders: PT Eval and Treat    Medical Diagnosis from Referral: ELBERT (stress urinary incontinence, female) [N39.3]   Evaluation Date: 5/24/2024  Authorization Period Expiration: 3/4/2025  Plan of Care Expiration: 8/24/2024  Visit # / Visits authorized: 2/20  Cancelled Visits: 1  No Show Visits: 0    Time In: 8:09 (late arrival)  Time Out: 8:50  Total Billable Time: 40 minutes    Precautions: Standard    Subjective     Pt reports: she is unable to double void.  Saw Dr. Gutierrez; is using Impressa successfully and now has a Revive, but the Revive comes out sometimes when she poops.  She notes that she may use both devices.    She was compliant with home exercise program until she got sick.  Response to previous treatment: no adverse effects  Functional change: I with use of Impressa; I with double voiding techniques.      Pain: 0/10      Objective     Monica participated in neuromuscular re-education activities to develop Coordination and Control for 40 minutes including: [ ] Kegels with assist of SEMG and abdominal sets  We worked in supine and standing with external lead wires.  She demonstrated normal baseline resting, good WR rise, and fair/good holding in 10 sec Kegels.  Derecruitment was complete and timely.  In TA sets, her WR rise was fair (improved with self cueing) and PF overflow was good.  We then moved to standing- we initially worked in supported standing, and her WR rise was good, derecruitment complete and timely (baseline slightly higher).  We reviewed postural education including proper postural alignment in standing Kegels.      Home Exercises Provided and Patient Education Provided     Education provided:   - anatomy/physiology of pelvic floor  and posture/body mechanices  Discussed progression of plan of care with patient; educated pt in activity modification; reviewed HEP with pt. Pt demonstrated and verbalized understanding of all instruction and was provided with a handout of HEP (see Patient Instructions).    Written Home Exercises Provided: yes.  Exercises were reviewed and Monica was able to demonstrate them prior to the end of the session.  Monica demonstrated good  understanding of the education provided.     See EMR under Patient Instructions for exercises provided 7/5/2024.    Assessment     Will look at dynamic activities next session, and quick flicks.    Monica Is progressing well towards her goals.   Pt prognosis is Good.     Pt will continue to benefit from skilled outpatient physical therapy to address the deficits listed in the problem list box on initial evaluation, provide pt/family education and to maximize pt's level of independence in the home and community environment.     Pt's spiritual, cultural and educational needs considered and pt agreeable to plan of care and goals.     Anticipated barriers to physical therapy: none    Goals: Short Term Goals: 2 weeks  Pt to report increased awareness of PFM lift/drop during exercises and functional activities.  Pt to be I with double voiding techniques.  Pt to be I with diaphragmatic breathing.       Long Term Goals: 12 weeks   Pt will report improved ability to perform ADLs (ie. dressing, bathing, functional transfers) with little (drops) to no urinary leakage 7/7 days per week.  Pt will be able to participate in exercise/recess/active play with less leakage of urine.   Pt will report improved ability to perform iADLs (ie. driving, home chores, grocery shopping) with little (drops) to no urinary leakage 7/7 days per week.   Pt will report improved ability to perform advanced iADLs (ie. gardening, yard work, heavy lifting, moving furniture) with little (drops) to no urinary leakage 7/7  days per week.   Pt will report improved ability to cough/sneeze/laugh/yell with little (drops) to no urinary leakage 7/7 days per week.   Pt will report a decrease in pad use to 1 pads per day.  Pt/family will be independent with HEP for continued self-management of symptoms.   ALL PROGRESSING    Plan     Plan of care Certification: 5/24/2024 to 8/24/2024.     Outpatient Physical Therapy 1 times per 2 week(s)  for 12 weeks to include the following interventions: therapeutic exercises, therapeutic activity, neuromuscular re-education, manual therapy, modalities PRN, patient/family education, and self care/home management    Nell Garcia, PT, BCB-PMD

## 2024-07-19 ENCOUNTER — CLINICAL SUPPORT (OUTPATIENT)
Dept: REHABILITATION | Facility: HOSPITAL | Age: 49
End: 2024-07-19
Payer: COMMERCIAL

## 2024-07-19 DIAGNOSIS — M62.89 PELVIC FLOOR DYSFUNCTION: Primary | ICD-10-CM

## 2024-07-19 PROCEDURE — 97112 NEUROMUSCULAR REEDUCATION: CPT

## 2024-07-19 NOTE — PROGRESS NOTES
"  Pelvic Health Physical Therapy   Treatment Note     Name: Monica Staton  Clinic Number: [id number]    Therapy Diagnosis:   Encounter Diagnosis   Name Primary?    Pelvic floor dysfunction Yes     Physician: Osiris Gonzalez MD    Visit Date: 7/19/2024    Physician Orders: PT Eval and Treat    Medical Diagnosis from Referral: ELBERT (stress urinary incontinence, female) [N39.3]   Evaluation Date: 5/24/2024  Authorization Period Expiration: 3/4/2025  Plan of Care Expiration: 8/24/2024  Visit # / Visits authorized: 3/20  Cancelled Visits: 1  No Show Visits: 0    Time In: 8:45  Time Out: 9:30  Total Billable Time: 45 minutes    Precautions: Standard    Subjective     Pt reports: that she is trying the Revive; it keeps coming out; not really helping.  Had a large scale incontinence episode when walking to her car at work.  "I feel like I don't have nerve endings."  She was compliant with home exercise program until she got sick.  Response to previous treatment: no adverse effects  Functional change: I with use of Impressa; I with double voiding techniques.      Pain: 0/10    Objective     Monica participated in neuromuscular re-education activities to develop Coordination and Control for 40 minutes including: [ ] Kegels with assist of SEMG and abdominal sets  We worked in supine and standing with external lead wires.  She demonstrated normal baseline resting, good WR rise, and fair/good holding in 10 sec Kegels.  Derecruitment was complete and timely.  In TA sets, her WR rise was fair (improved with self cueing) and PF overflow was good.  We then moved to standing- we initially worked in supported standing, and her WR rise was good, derecruitment complete and timely (baseline slightly higher).  I advised her to try one set of Kegels while walking (holding 10 sec, releasing 10 sec), and she was issued a bladder diary for 3 days to complete and bring in next session.        Home Exercises Provided and Patient Education " Provided     Education provided:   - anatomy/physiology of pelvic floor and posture/body mechanices  Discussed progression of plan of care with patient; educated pt in activity modification; reviewed HEP with pt. Pt demonstrated and verbalized understanding of all instruction and was provided with a handout of HEP (see Patient Instructions).    Written Home Exercises Provided: yes.  Exercises were reviewed and Monica was able to demonstrate them prior to the end of the session.  Monica demonstrated good  understanding of the education provided.     See EMR under Patient Instructions for exercises provided 7/19/2024.    Assessment     Will explore her ability to empty her bladder next session- may do an EMG voiding trial.  Hard to know if her leakage is due to detrusor instability or sphincter insufficiency.  UDS may help her decide between urethral bulking and OAB meds.        Monica Is progressing well towards her goals.   Pt prognosis is Good.     Pt will continue to benefit from skilled outpatient physical therapy to address the deficits listed in the problem list box on initial evaluation, provide pt/family education and to maximize pt's level of independence in the home and community environment.     Pt's spiritual, cultural and educational needs considered and pt agreeable to plan of care and goals.     Anticipated barriers to physical therapy: none    Goals: Short Term Goals: 2 weeks  Pt to report increased awareness of PFM lift/drop during exercises and functional activities.  Pt to be I with double voiding techniques.  Pt to be I with diaphragmatic breathing.       Long Term Goals: 12 weeks   Pt will report improved ability to perform ADLs (ie. dressing, bathing, functional transfers) with little (drops) to no urinary leakage 7/7 days per week.  Pt will be able to participate in exercise/recess/active play with less leakage of urine.   Pt will report improved ability to perform iADLs (ie. driving,  home chores, grocery shopping) with little (drops) to no urinary leakage 7/7 days per week.   Pt will report improved ability to perform advanced iADLs (ie. gardening, yard work, heavy lifting, moving furniture) with little (drops) to no urinary leakage 7/7 days per week.   Pt will report improved ability to cough/sneeze/laugh/yell with little (drops) to no urinary leakage 7/7 days per week.   Pt will report a decrease in pad use to 1 pads per day.  Pt/family will be independent with HEP for continued self-management of symptoms.   ALL PROGRESSING    Plan     Plan of care Certification: 5/24/2024 to 8/24/2024.     Outpatient Physical Therapy 1 times per 2 week(s)  for 12 weeks to include the following interventions: therapeutic exercises, therapeutic activity, neuromuscular re-education, manual therapy, modalities PRN, patient/family education, and self care/home management    Nell Garcia, PT, BCB-PMD

## 2024-07-19 NOTE — PATIENT INSTRUCTIONS
Bladder Diary       1 2 3 4 5 6   Time of Day Type & Amount   of Fluid and   Food Intake  Amount Voided:  Seconds,  Ounces,   or S/M/L  Amount of leakage:  S/M/L Amount of Urge Present:  Mild, Mod, Strong Activity that caused leakage   Midnight        1AM        2AM        3AM        4AM        5AM        6AM        7AM        8AM        9AM        10AM        11AM        Noon        1PM        2PM        3PM        4PM        5PM        6PM        7PM        8PM        9PM        10PM        11PM          Number of pads used:  Day________________Night____________________

## 2024-08-05 ENCOUNTER — CLINICAL SUPPORT (OUTPATIENT)
Dept: REHABILITATION | Facility: HOSPITAL | Age: 49
End: 2024-08-05
Payer: COMMERCIAL

## 2024-08-05 DIAGNOSIS — M62.89 PELVIC FLOOR DYSFUNCTION: Primary | ICD-10-CM

## 2024-08-05 PROCEDURE — 97112 NEUROMUSCULAR REEDUCATION: CPT | Mod: PO

## 2024-08-13 ENCOUNTER — OFFICE VISIT (OUTPATIENT)
Dept: UROLOGY | Facility: CLINIC | Age: 49
End: 2024-08-13
Payer: COMMERCIAL

## 2024-08-13 ENCOUNTER — HOSPITAL ENCOUNTER (OUTPATIENT)
Dept: RADIOLOGY | Facility: HOSPITAL | Age: 49
Discharge: HOME | End: 2024-08-13
Payer: COMMERCIAL

## 2024-08-13 VITALS
SYSTOLIC BLOOD PRESSURE: 107 MMHG | WEIGHT: 147.5 LBS | DIASTOLIC BLOOD PRESSURE: 75 MMHG | BODY MASS INDEX: 24.54 KG/M2 | HEART RATE: 96 BPM

## 2024-08-13 DIAGNOSIS — R33.9 INCOMPLETE BLADDER EMPTYING: ICD-10-CM

## 2024-08-13 DIAGNOSIS — R33.9 INCOMPLETE BLADDER EMPTYING: Primary | ICD-10-CM

## 2024-08-13 PROCEDURE — 3074F SYST BP LT 130 MM HG: CPT | Mod: CPTII,S$GLB,, | Performed by: UROLOGY

## 2024-08-13 PROCEDURE — 3078F DIAST BP <80 MM HG: CPT | Mod: CPTII,S$GLB,, | Performed by: UROLOGY

## 2024-08-13 PROCEDURE — 1159F MED LIST DOCD IN RCRD: CPT | Mod: CPTII,S$GLB,, | Performed by: UROLOGY

## 2024-08-13 PROCEDURE — 99204 OFFICE O/P NEW MOD 45 MIN: CPT | Mod: S$GLB,,, | Performed by: UROLOGY

## 2024-08-13 PROCEDURE — 99999 PR PBB SHADOW E&M-EST. PATIENT-LVL III: CPT | Mod: PBBFAC,,, | Performed by: UROLOGY

## 2024-08-13 PROCEDURE — 3008F BODY MASS INDEX DOCD: CPT | Mod: CPTII,S$GLB,, | Performed by: UROLOGY

## 2024-08-13 PROCEDURE — 4010F ACE/ARB THERAPY RXD/TAKEN: CPT | Mod: CPTII,S$GLB,, | Performed by: UROLOGY

## 2024-08-13 RX ORDER — ROSUVASTATIN CALCIUM 20 MG/1
20 TABLET, COATED ORAL
COMMUNITY
Start: 2024-04-12

## 2024-08-13 RX ORDER — LIDOCAINE HYDROCHLORIDE 20 MG/ML
JELLY TOPICAL ONCE
OUTPATIENT
Start: 2024-08-13 | End: 2024-08-13

## 2024-08-13 RX ORDER — MIRABEGRON 50 MG/1
50 TABLET, EXTENDED RELEASE ORAL DAILY
Qty: 30 TABLET | Refills: 11 | Status: SHIPPED | OUTPATIENT
Start: 2024-08-13 | End: 2025-08-13

## 2024-08-13 RX ORDER — LISDEXAMFETAMINE DIMESYLATE 30 MG/1
30 CAPSULE ORAL
COMMUNITY
Start: 2024-07-13

## 2024-08-13 NOTE — PROGRESS NOTES
[facility]      New Urinary Incontinence Note    8/13/2024    Referred by:  No ref. provider found    History of Present Illness    Ms. Staton presents today for voiding difficulty and urinary incontinence.    She reports a 5-8 year history of stress incontinence (leakage during coughing, sneezing, laughing, and lifting) and a 3-year history of urgency incontinence (leakage during walks and occasional large volume leaks without warning). She denies typical urgency sensations prior to leakage. Symptoms significantly impact daily life, including work and social activities. She has implemented timed voiding every two hours and reduced fluid intake, but continues to have incontinence episodes. She occasionally uses an Impressa pessary (size 3), which helps with stress incontinence but is less effective for urgency incontinence. She denies nocturia, typically waking once nightly to void. Recent incident of complete soaking of white jeans occurred 20 minutes after emptying her bladder. No improvement in symptoms despite 30-pound weight loss and 6-month alcohol cessation.    She reports double voiding to empty her bladder, indicating occasional incomplete emptying. Standing up and sitting back down during double voiding sometimes results in expelling a small additional amount of urine. Leaning forward during urination does not provide significant improvement.    She has undergone pelvic floor physical therapy and can perform 10-second Kegel exercises in various positions. 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 It's effective during normal activity but less so during intense 45-minute walks. She tried a reusable device called Revive but found it uncomfortable, particularly during bowel movements.    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  She is a non-smoker. Over the past year, she has lost 30 lbs, partly attributed to starting Ozempic. She stopped alcohol consumption 6 months ago, reporting improved well-being. She has reduced liquid intake, eliminated iced tea, and occasionally consumes energy drinks during double shifts.    She denies any previous pelvic surgeries or significant medical issues affecting voiding. Currently taking Ozempic for weight management.    Sleep has improved since stopping alcohol. She now wakes once nightly to urinate, decreased from 1-2 times previously. She denies other sleep disturbances.    She denies vaginal pressure, bulge, heaviness, or dyspareunia.    She denies any relevant family medical history.    She denies any relevant allergies.          A review of 10+ systems was conducted with pertinent positive and negative findings documented in HPI with all other systems reviewed and negative.    Past medical, family, surgical and social history reviewed as documented in chart with pertinent positive medical, family, surgical and social history detailed in HPI.    Exam Findings:    Physical Exam    General: No acute distress. Nontoxic appearing.  HENT: Normocephalic. Atraumatic.  Respiratory: Normal respiratory effort. No conversational dyspnea. No audible wheezing.  Abdomen: No obvious distension.  Skin: No visible abnormalities.  Extremities: No edema upper extremities. No edema lower extremities.  Neurological: Alert and oriented x3. Normal speech.  Psychiatric: Normal mood. Normal affect. No evidence of SI.          Assessment/Plan:    Assessment & Plan    - Suspect urgency incontinence without classic urgency symptoms, possibly a subtype more commonly seen in older women  - Consider overactive bladder as potential diagnosis despite atypical presentation  - Plan for urodynamic evaluation to further investigate incontinence etiology  - Will perform cystoscopy during urodynamic testing to examine bladder  - Trial of  overactive bladder medication to assess response and potentially confirm diagnosis  URINARY RETENTION:  - Explained guarding reflex and potential negative impact of leaning forward during urination.  - Discussed double voiding technique and recommended standing up and sitting back down method.  - Ms. Staton to continue timed voiding every 2 hours.  - Ms. Staton to avoid leaning forward when urinating.  - Recommend using stand up and sit back down method if double voiding.  OVERACTIVE BLADDER:  - Started overactive bladder medication.  - Contact the office to assess response to overactive bladder medication at next visit.  URODYNAMIC EVALUATION:  - Urodynamic evaluation ordered.  CYSTOSCOPY:  - Cystoscopy ordered.  FOLLOW UP:  - Follow up to coincide with urodynamic testing.

## 2024-08-23 ENCOUNTER — PATIENT MESSAGE (OUTPATIENT)
Dept: OBSTETRICS AND GYNECOLOGY | Facility: CLINIC | Age: 49
End: 2024-08-23
Payer: COMMERCIAL

## 2024-08-23 RX ORDER — DROSPIRENONE 4 MG/1
4 TABLET, FILM COATED ORAL DAILY
Qty: 84 TABLET | Refills: 1 | Status: SHIPPED | OUTPATIENT
Start: 2024-08-23

## 2024-08-23 NOTE — TELEPHONE ENCOUNTER
Needs refill to go to [facility].  Out of medication. Due for annual in January    Can you please sign for Dr. Gonzalez's pt?

## 2024-09-18 ENCOUNTER — HOSPITAL ENCOUNTER (OUTPATIENT)
Dept: RADIOLOGY | Facility: HOSPITAL | Age: 49
Discharge: HOME | End: 2024-09-18
Payer: COMMERCIAL

## 2024-09-18 ENCOUNTER — PROCEDURE VISIT (OUTPATIENT)
Facility: CLINIC | Age: 49
End: 2024-09-18
Payer: COMMERCIAL

## 2024-09-18 VITALS
HEIGHT: 65 IN | SYSTOLIC BLOOD PRESSURE: 109 MMHG | HEART RATE: 90 BPM | WEIGHT: 145.75 LBS | DIASTOLIC BLOOD PRESSURE: 77 MMHG | BODY MASS INDEX: 24.28 KG/M2 | RESPIRATION RATE: 18 BRPM | TEMPERATURE: 98 F

## 2024-09-18 DIAGNOSIS — R33.9 INCOMPLETE BLADDER EMPTYING: ICD-10-CM

## 2024-09-18 PROCEDURE — 74450 X-RAY URETHRA/BLADDER: CPT | Mod: TC

## 2024-09-18 RX ORDER — LIDOCAINE HYDROCHLORIDE 20 MG/ML
JELLY TOPICAL
Status: COMPLETED | OUTPATIENT
Start: 2024-09-18 | End: 2024-09-18

## 2024-09-18 RX ADMIN — LIDOCAINE HYDROCHLORIDE: 20 JELLY TOPICAL at 08:09

## 2024-09-18 NOTE — PROCEDURES
Urodynamic Report    [facility]       Referring Physician:  Kaylie Holly NP    YOB: 1975  Date of Exam: 9/18/2024    HPI:     She reports a 5-8 year history of stress incontinence (leakage during coughing, sneezing, laughing, and lifting) and a 3-year history of urgency incontinence (leakage during walks and occasional large volume leaks without warning). She denies typical urgency sensations prior to leakage. The more recent urgency incontinence is more bothersome. Symptoms significantly impact daily life, including work and social activities. She has implemented timed voiding every two hours and reduced fluid intake, but continues to have incontinence episodes. She occasionally uses an Impressa pessary (size 3), which helps with stress incontinence but is not effective for urgency incontinence. She denies nocturia, typically waking once nightly to void. Recent incident of complete soaking of white jeans occurred 20 minutes after emptying her bladder. No improvement in symptoms despite 30-pound weight loss and 6-month alcohol cessation.     She reports double voiding to empty her bladder, indicating occasional incomplete emptying. Standing up and sitting back down during double voiding sometimes results in expelling a small additional amount of urine. Leaning forward during urination does not provide significant improvement.     She has undergone pelvic floor physical therapy and can perform 10-second Kegel exercises in various positions. 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 It's effective during normal activity but less so during intense 45-minute walks. She tried a reusable device called Revive but found it uncomfortable, particularly during bowel movements.    She reports today more bothered by incontinence with exercise, specifically walking. She notes little  incontinence when sitting but much more so with standing or activity. She distinguishes two types of incontinence: slow steady drops when she is most active and occasional complete emptying most consistent with UUI.        Cystometrogram:    Position: Sitting  Filling Rate: 30 mL/sec   Catheter: 7F  Fluid:Conray       Cath PVR prior: 10 mL Voiding Diary Capacity: Not Available   First Sensation: 5 mL First Desire: 48 mL   Strong Desire: 407 mL Cystometric Capacity: 435 mL       Pdet at senior living: 0 cm H2O Compliance: Normal       Detrusor Overactivity (DO): Absent  Volume first DO: No DO   Max DO Pressure: No DO Urgency Incontinence: Absent        Leak Point Pressure Testing:        Volume Tested: 410 mL  Abdominal Leak Point Pressure: No Leak   Stress Induced DO: Absent          Voiding Study:        Voided Volume: 435 mL PVR: 0 mL   Maximum Flow: 24 mL/sec Average Flow 6.6 mL/sec   Max Pdet: 36 cmH2O  Pdet at Max Flow: 36 cmH2O   EMG Storage: Normal Recruitment  EMG Voiding: Flaring with valsalva       Fluroscopic Imaging:        Bladder Contour: Smooth Vesicoureteral Reflux:No VUR   Bladder Neck at Rest: closed Bladder Neck Voiding:Patent       Impression:        Sensation:Normal    Capacity: Normal    Compliance:Normal    Detrusor Overactivity:Absent    Continence: No Incontinence    Contractility: Valsalva augmented voiding    Emptying:Satisfactory    Coordination:Dysfunctional (Valsalva) Voiding          Summary:  This study was performed in accordance with the current AUA/SUFU Guideline on Adult Urodynamics. On filling phase she demonstrates normal first and strong desire with a normal bladder capacity. There is no detrusor overactivity (DO) demonstrated on this exam (though absence of DO on urodynamic evaluation does not exclude it as causative agent of urgency symptoms). Bladder compliance at capacity is normal. On fluoroscopy, the bladder contour is smooth. There is no VUR demonstrated on this exam.     On  stress testing, with adequate cough and valsalva effort, there is no ELBERT demonstrated but patient reports a history consistent with clinically significant ELBERT. ELBERT may not be demonstrated on urodynamic evaluation in all patients with clinically significant ELBERT.    On voiding phase,  she augments and underlying detrusor contraction with valsalva. She reports she did this more so on today's study, likely due to test anxiety . On VCUG, the urethra appears patent throughout voiding. The patient empties completely. The patient reports this is not their typical voiding pattern and voiding pattern appeared normal on independent flow rate.     Cystoscopy Details: Informed consent was obtained and she was sterily prepped and 1% lidocaine jelly was injected per urethra. A flexible cystoscope was inserted into the bladder via the urethra. There was no evidence of stricture, stenosis, lesions, other obstruction, or diverticulum. Significant findings included a normal unobstructed urethra only. Cystoscopic examination of the bladder revealed orthotopically positioned, normal bilateral ureteral orifices with clear yellow urine effluxing from each orifice. All mucosal surfaces were examined with no apparent stones, tumors, foreign bodies, erythema, trabeculation, diverticula, or ulcers. The procedure was concluded without complications. The patient was not administered a post-procedure antibiotic.     She has both clinical ELBERT with high leak point pressures as well as (likely) occasional urgency incontinence. She reports more bother with the ELBERT as this keeps her from exercising (walking) which she enjoys. She has worked with PFPT for several months without much change. We discussed RPMUS for treatment of the ELBERT. We discussed some possibility of this improving the occasional UUI, though I did not demonstrate SIDO or stress induced urgency on today's study. She has recently taken Myrbetriq without any change in symptoms.     She  is going to consider this and will reach out to me if I can be of any further assistance.

## 2024-09-18 NOTE — PATIENT INSTRUCTIONS
SIMPLE URODYNAMIC STUDY (SUDS) CYSTOSCOPY  FLUORO URODYNAMIC STUDY (FUDS) CYSTOSCOPY  DISCHARGE INSTRUCTIONS    You have had a procedure that will require time to properly heal. Follow the instructions you have been given on how to care for yourself once you are home. Below is additional information to help in your recovery.    ACTIVITY  There are no restrictions in activity. Start doing again the things you did before the procedure.  You may experience a slight burning sensation and notice a small amount of blood in your urine after your procedure. This will clear up within a day. Call the clinic if it continues beyond 48 hours.  If you are sent home with a catheter in place, only take showers until the catheter is removed.    DIET  Continue your normal diet. You may eat the same foods you ate before your procedure.  Drink plenty of fluids during the first 24 to 48 hours following your procedure.    MEDICATIONS  Resume all other previous medications from your prescribing physician.  Continue any pre-procedure antibiotics until they are all gone.    SIGNS AND SYMPTOMS TO REPORT TO THE DOCTOR  Chills or fever greater than 101° F within 24 hours of procedure.  Changes in urination, such as increased bleeding, foul smell, cloudy urine, or painful urination.  Call your doctor with any questions or concerns.    For any emergency situation, call 911 immediately or go to your nearest emergency room.    [facility]  [phone]

## 2024-10-24 NOTE — PROGRESS NOTES
Chief Complaint   Patient presents with    Follow-up        HPI: Patient is a 49 y.o. female  with mixed urinary incontinence who presents for follow up. Patient was last seen on 24 and planned to continue with PFPT but also was going to try Revive.     Patient was seeing Nell Garcia who recommended she see Dr. Epps for a second opinion since symptoms persisted despite PT. Had an appointment where urodynamic testing and cystoscopy were performed.     Reports that she suddenly and spontaneously leaks urine. She empties her bladder completely.     She reports that she is voiding every 2 hours through the day and overnight she will wake once urgently even when she slows down fluid intake. Does not leak urine on the way to the bathroom. Reports that the urgency only occurs this one time over night. May rarely wake twice per night. Will limit intake a couple of hours before bedtime.  States that she cannot walk around her neighborhood any more without filling up a size 5 pad. Has tried Revive and did not find it helpful. Falls out with bowel movements. Uses the size 3 Impressa and feels like it will slow the leaking but she will continue to leak urine and uses 2 size 5 pads when she is working overnight.   Not feeling any urgency when she is walking but will leak urine with every step.     In the morning she finds that she is leak free but as the day progresses she leaks more urine.   Has no sensory awareness of the leakage at times.     Tried Myrbetriq for one month and did not notice any change in symptoms.     REVIEW OF SYSTEMS:  A full 14-point ROS was performed and was significant for those  mentioned in the HPI.    The following portions of the patient's history were reviewed and updated as appropriate: allergies, current medications, past medical history, past surgical history and problem list.    PHYSICAL EXAMINATION    Vitals:    10/25/24 1437   BP: (!) 96/58        General: Healthy in  appearance, Well nourished, Affect Normal, NAD.    Genitourinary- (Verbal consent obtained)  Vulva: normal without lesions, masses, atrophy or pain  Urethra: meatus central and normal in appearance, no prolapse/caruncle, no masses or discharge. Empty supine stress test was negative.    No visits with results within 1 Day(s) from this visit.   Latest known visit with results is:   Office Visit on 06/13/2024   Component Date Value Ref Range Status    Glucose, UA 06/13/2024 nor   Final    Bilirubin, POC 06/13/2024 neg   Final    Ketones, UA 06/13/2024 neg   Final    Spec Grav UA 06/13/2024 1.020   Final    Blood, UA 06/13/2024 250   Final    pH, UA 06/13/2024 6   Final    Protein, POC 06/13/2024 trace   Final    Urobilinogen, UA 06/13/2024 neg   Final    Nitrite, UA 06/13/2024 neg   Final    WBC, UA 06/13/2024 neg   Final    Color, UA 06/13/2024 Yellow   Final    Clarity, UA 06/13/2024 Clear   Final    POC Residual Urine Volume 06/13/2024 0  0 - 100 mL Final        ASSESSMENT & PLAN:      Stress incontinence    Urge incontinence of urine    Urinary urgency       50 yo with mixed urinary incontinence whose symptoms have progressively worsened despite working with a pelvic floor physical therapist. Primarily bothered by leakage of urine with walking but also by incontinence without sensory awareness. On 9/18/24 she underwent urodynamic testing and cystoscopy with Dr. Epps. Test results were reviewed and the cystoscopy was normal. Urodynamic testing showed a normal first sensation and first desire as well as normal bladder capacity of 435 mL. No detrusor instability was noted. LPP performed with 410 mL and no leakage was noted. No stress induced DI was noted either.   A retropubic sling was recommended by patient was unsure about proceeding since her testing was inconclusive. Myrbetriq was not helpful.     Recommended today that we try a pessary fitting as it would target stress urinary incontinence symptoms which  she describes given the leakage associated with walking/ exercise. She was fit with a #3 ring with knob and support. Could possibly try a larger size but none were available for the fitting today. We had a #3 ring with knob and support and she was provided it to use today and we ordered another for replacement. She will return in 1-2 weeks for re-evaluation of symptoms. She was shown how to insert and remove the pessary.     All questions were answered today. The patient was encouraged to contact the office as needed with any additional questions or concerns.     Total time spent on visit was 40 minutes.  This includes face to face time and non-face to face time preparing to see the patient (eg, review of tests), Obtaining and/or reviewing separately obtained history, Documenting clinical information in the electronic or other health record, Independently interpreting resultsand communicating results to the patient/family/caregiver, or Care coordination.    Etta Gutierrez MD

## 2024-10-25 ENCOUNTER — OFFICE VISIT (OUTPATIENT)
Dept: UROGYNECOLOGY | Facility: CLINIC | Age: 49
End: 2024-10-25
Payer: COMMERCIAL

## 2024-10-25 VITALS
WEIGHT: 145.31 LBS | HEIGHT: 65 IN | DIASTOLIC BLOOD PRESSURE: 58 MMHG | BODY MASS INDEX: 24.21 KG/M2 | SYSTOLIC BLOOD PRESSURE: 96 MMHG

## 2024-10-25 DIAGNOSIS — R39.15 URINARY URGENCY: ICD-10-CM

## 2024-10-25 DIAGNOSIS — N39.41 URGE INCONTINENCE OF URINE: ICD-10-CM

## 2024-10-25 DIAGNOSIS — N39.3 STRESS INCONTINENCE: Primary | ICD-10-CM

## 2024-10-25 PROCEDURE — 99999 PR PBB SHADOW E&M-EST. PATIENT-LVL III: CPT | Mod: PBBFAC,,, | Performed by: OBSTETRICS & GYNECOLOGY

## 2024-10-28 ENCOUNTER — PATIENT MESSAGE (OUTPATIENT)
Dept: UROGYNECOLOGY | Facility: CLINIC | Age: 49
End: 2024-10-28
Payer: COMMERCIAL

## 2024-10-30 ENCOUNTER — CLINICAL SUPPORT (OUTPATIENT)
Dept: REHABILITATION | Facility: HOSPITAL | Age: 49
End: 2024-10-30
Payer: COMMERCIAL

## 2024-10-30 DIAGNOSIS — M62.89 PELVIC FLOOR DYSFUNCTION: Primary | ICD-10-CM

## 2024-10-30 PROCEDURE — 97112 NEUROMUSCULAR REEDUCATION: CPT | Mod: PO

## 2025-01-28 RX ORDER — DROSPIRENONE 4 MG/1
1 TABLET, FILM COATED ORAL
Qty: 84 TABLET | Refills: 0 | Status: SHIPPED | OUTPATIENT
Start: 2025-01-28

## 2025-01-28 NOTE — TELEPHONE ENCOUNTER
Refill Decision Note   Monica Staton  is requesting a refill authorization.  Brief Assessment and Rationale for Refill:  Approve     Medication Therapy Plan:       Medication Reconciliation Completed: No   Comments:     No Care Gaps recommended.     Note composed:1:49 PM 01/28/2025

## 2025-04-21 RX ORDER — DROSPIRENONE 4 MG/1
1 TABLET, FILM COATED ORAL
Qty: 84 TABLET | Refills: 0 | Status: SHIPPED | OUTPATIENT
Start: 2025-04-21

## 2025-04-21 NOTE — TELEPHONE ENCOUNTER
Refill Decision Note   Monica Staton  is requesting a refill authorization.  Brief Assessment and Rationale for Refill:  Approve     Medication Therapy Plan:         Comments:     Note composed:6:55 PM 04/21/2025

## 2025-07-03 RX ORDER — DROSPIRENONE 4 MG/1
4 TABLET, FILM COATED ORAL DAILY
Qty: 90 TABLET | Refills: 2 | OUTPATIENT
Start: 2025-07-03

## 2025-07-03 RX ORDER — DROSPIRENONE 4 MG/1
1 TABLET, FILM COATED ORAL DAILY
Qty: 84 TABLET | Refills: 0 | Status: SHIPPED | OUTPATIENT
Start: 2025-07-03

## 2025-07-03 RX ORDER — DROSPIRENONE 4 MG/1
1 TABLET, FILM COATED ORAL DAILY
Qty: 84 TABLET | Refills: 0 | Status: CANCELLED | OUTPATIENT
Start: 2025-07-03

## 2025-07-03 NOTE — TELEPHONE ENCOUNTER
Refill Routing Note   Medication(s) are not appropriate for processing by [facility] for the following reason(s):        No active prescription written by provider    [facility] action(s):  Defer        Medication Therapy Plan: Last ordered: 2 months ago (4/21/2025) by Latoya Hernandez MD      Appointments  past 12m or future 3m with PCP    Date Provider   Last Visit   1/24/2024 Osiris Gonzalez MD   Next Visit   11/21/2025 Osiris Gonzalez MD   ED visits in past 90 days: 0        Note composed:11:48 AM 07/03/2025